# Patient Record
Sex: FEMALE | Race: ASIAN | Employment: STUDENT | URBAN - METROPOLITAN AREA
[De-identification: names, ages, dates, MRNs, and addresses within clinical notes are randomized per-mention and may not be internally consistent; named-entity substitution may affect disease eponyms.]

---
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
## 2024-06-21 ENCOUNTER — NURSE ONLY (OUTPATIENT)
Dept: FAMILY MEDICINE CLINIC | Facility: CLINIC | Age: 24
End: 2024-06-21

## 2024-06-21 DIAGNOSIS — Z23 NEED FOR VACCINATION: Primary | ICD-10-CM

## 2024-06-21 DIAGNOSIS — Z11.1 SCREENING FOR TUBERCULOSIS: ICD-10-CM

## 2024-06-21 PROCEDURE — 90715 TDAP VACCINE 7 YRS/> IM: CPT | Performed by: NURSE PRACTITIONER

## 2024-06-21 PROCEDURE — 90471 IMMUNIZATION ADMIN: CPT | Performed by: NURSE PRACTITIONER

## 2024-06-21 PROCEDURE — 86580 TB INTRADERMAL TEST: CPT | Performed by: NURSE PRACTITIONER

## 2024-06-21 NOTE — PROGRESS NOTES
Pt here for TB test and TDAP vaccine for school. Vaccine questionnaire completed for both TDAP and TB screening, reviewed by provider.   Placed TDAP to L deltoid, TB to LFA, pt tolerated well.   Provided pt w TDAP VIS and instructions on when to RTC for TB read, pt v/u.

## 2024-06-21 NOTE — PATIENT INSTRUCTIONS
You will need to return to clinic in 48-72 hours to have results of TB test read.     Please return to clinic on 6/23/24 after 9:45AM or on 6/24/24 before 9:45AM to have your TB test read.    If you do not return during this time your test will need to be repeated.     Our clinic hours are:  Monday-Friday        8:00 am to 7:30 pm  Saturday/Sunday    9:00 am to 4:30 pm    You can go to any of the [facility] Walk-In Clinics to have your TB test read.  To find your nearest Walk-In Clinic go to [email or web address]

## 2024-06-24 ENCOUNTER — OFFICE VISIT (OUTPATIENT)
Dept: FAMILY MEDICINE CLINIC | Facility: CLINIC | Age: 24
End: 2024-06-24

## 2024-06-24 DIAGNOSIS — Z11.1 ENCOUNTER FOR PPD SKIN TEST READING: Primary | ICD-10-CM

## 2024-06-24 LAB — INDURATION (): 0 MM (ref 0–11)

## 2024-06-24 NOTE — PROGRESS NOTES
Patient presents for PPD read.     Recent Results (from the past 24 hour(s))   TB /PPD intradermal test    Collection Time: 06/24/24  8:43 AM   Result Value Ref Range    Date Given: 06/21/24     Site: L forearm     INDURATION (PPD) 0.00 0.0 - 11 mm         Letter of results printed and given patient.    No further questions or concerns at this time.

## 2024-07-01 ENCOUNTER — OFFICE VISIT (OUTPATIENT)
Dept: FAMILY MEDICINE CLINIC | Facility: CLINIC | Age: 24
End: 2024-07-01
Payer: COMMERCIAL

## 2024-07-01 DIAGNOSIS — Z11.1 ENCOUNTER FOR PPD TEST: Primary | ICD-10-CM

## 2024-07-01 PROCEDURE — 86580 TB INTRADERMAL TEST: CPT | Performed by: NURSE PRACTITIONER

## 2024-07-01 NOTE — PATIENT INSTRUCTIONS
Please return to clinic on 7/3/24 after 9:30am  or on 7/4/24 before 9:30am  to have your TB test read.    Our clinic hours are:  Monday-Friday        8:00 am to 7:30 pm  Saturday/Sunday    9:00 am to 4:30 pm    You can go to any of the [facility] Walk-In Clinics to have your TB test read.  To find your nearest Walk-In Clinic go to [email or web address]

## 2024-07-01 NOTE — PROGRESS NOTES
Pt presents for second step of tb test. No contraindications for screening after reviewing pt questionnaire.  PPD test administered to right forearm. Pt tolerated well. Discussed and reviewed the return-window to have test read (return to clinic in 48-72 hours to have results of TB test read). Patient aware if she does not return during this time, the test will need to be repeated. Paper documentation of TB test with results will be provided to patient at the time of read. Pt verbalized understanding.

## 2024-07-03 ENCOUNTER — OFFICE VISIT (OUTPATIENT)
Dept: FAMILY MEDICINE CLINIC | Facility: CLINIC | Age: 24
End: 2024-07-03
Payer: COMMERCIAL

## 2024-07-03 DIAGNOSIS — Z11.1 ENCOUNTER FOR PPD SKIN TEST READING: Primary | ICD-10-CM

## 2024-07-03 LAB — INDURATION (): 0 MM (ref 0–11)

## 2024-07-03 NOTE — PROGRESS NOTES
Patient presents today for PPD skin test reading. Reading is negative. Letter printed and provided to patient.

## 2025-06-13 ENCOUNTER — NURSE ONLY (OUTPATIENT)
Dept: FAMILY MEDICINE CLINIC | Facility: CLINIC | Age: 25
End: 2025-06-13
Payer: COMMERCIAL

## 2025-06-13 DIAGNOSIS — Z11.1 SCREENING EXAMINATION FOR PULMONARY TUBERCULOSIS: Primary | ICD-10-CM

## 2025-06-13 PROCEDURE — 86580 TB INTRADERMAL TEST: CPT | Performed by: NURSE PRACTITIONER

## 2025-06-13 NOTE — PROGRESS NOTES
Gael Aly is a 25 year old female who presents for TB testing.    See TB flowsheet.   TB skin test placed today on LEFT forearm.

## 2025-06-15 ENCOUNTER — OFFICE VISIT (OUTPATIENT)
Dept: FAMILY MEDICINE CLINIC | Facility: CLINIC | Age: 25
End: 2025-06-15
Payer: COMMERCIAL

## 2025-06-15 DIAGNOSIS — Z11.1 ENCOUNTER FOR PPD SKIN TEST READING: Primary | ICD-10-CM

## 2025-06-15 LAB — INDURATION (): 0 MM (ref 0–11)

## 2025-06-15 NOTE — PROGRESS NOTES
Patient presents for PPD read.     Recent Results (from the past 24 hours)   TB test, PPD/Tubersol/Aplisol (62217) (Dx/Z11.1)    Collection Time: 06/15/25  9:14 AM   Result Value Ref Range    Date Given: 6/13/25     Site: Left Forearm     INDURATION (PPD) 0.00 0.0 - 11 mm         Letter of results printed and given patient.    No further questions or concerns at this time.

## 2025-06-27 ENCOUNTER — NURSE ONLY (OUTPATIENT)
Dept: FAMILY MEDICINE CLINIC | Facility: CLINIC | Age: 25
End: 2025-06-27
Payer: COMMERCIAL

## 2025-06-27 DIAGNOSIS — Z11.1 SCREENING FOR TUBERCULOSIS: Primary | ICD-10-CM

## 2025-06-27 PROCEDURE — 86580 TB INTRADERMAL TEST: CPT | Performed by: NURSE PRACTITIONER

## 2025-06-27 NOTE — PROGRESS NOTES
Pt reports to clinic for 2nd step of 2 step TB test.  Test placed by MA and pt tolerated well.  Pt advised must return within exactly 48-72 hours for TB read.  Pt verbalizes understanding.

## 2025-06-29 ENCOUNTER — NURSE ONLY (OUTPATIENT)
Dept: FAMILY MEDICINE CLINIC | Facility: CLINIC | Age: 25
End: 2025-06-29
Payer: COMMERCIAL

## 2025-06-29 DIAGNOSIS — Z11.1 ENCOUNTER FOR PPD SKIN TEST READING: Primary | ICD-10-CM

## 2025-06-29 LAB — INDURATION (): 0 MM (ref 0–11)

## (undated) NOTE — LETTER
June 29, 2025          Gael Bedolladeanne  [address]  [facility] [address]          Dear Gael Wilcox:    The following are the results of your recent tests. Please review the list of test results.  Your result is the value on the left; we have also supplied the range of normal (low and high) values.    Results for orders placed or performed in visit on 06/27/25   TB test, PPD/Tubersol/Aplisol (43245) (DX V74.1/Z11.1)    Collection Time: 06/29/25 11:25 AM   Result Value Ref Range    Date Given: 6/27/25     Site: Right Forearm     INDURATION (PPD) 0.00 0.0 - 11 mm       Please call if you have further questions,    [place] [facility]

## (undated) NOTE — LETTER
6/27/2025              Gael Bedollaanto        [address]        [place] [address]         Dear Gael Wilcox,    Please come back in 48 - 72 hours to have your TB test read. This will be Sunday 6/29/25 after 9:22 am or Monday 6/30/25 before 9:22 am.    Our hours are:  Monday - Friday : 8 am - 7:30 pm  Saturday - Sunday : 8 am - 3:30 pm    Sincerely,    FELY Lynch          Document electronically generated by:  Scarlett KING CMA

## (undated) NOTE — LETTER
Fannie 15, 2025          Gael Bedolladeanne  [address]  [facility] [address]          Dear Gael Wilcox:    The following are the results of your recent tests. Please review the list of test results.  Your result is the value on the left; we have also supplied the range of normal (low and high) values.    Results for orders placed or performed in visit on 06/13/25   TB test, PPD/Tubersol/Aplisol (41214) (Dx/Z11.1)    Collection Time: 06/15/25  9:14 AM   Result Value Ref Range    Date Given: 6/13/25     Site: Left Forearm     INDURATION (PPD) 0.00 0.0 - 11 mm       Please call if you have further questions,    [facility] [facility]

## (undated) NOTE — LETTER
6/13/2025              Gael Aly        [address] , [address]        [place] [address]         Dear Gael Wilcox,    Please come back in 48 - 72 hours to have your TB Skin Test read. This will be Sunday, 6/8/25 after 8:59 am or Monday, 6/9/25 before 8:59 am.    Our hours are:  Monday - Friday : 8 am - 7:30 pm  Saturday - Sunday : 8 am - 3:30 pm    Thank you            Document electronically generated by:  Scarlett KING CMA

## (undated) NOTE — MR AVS SNAPSHOT
[address], [address]  [address] [facility]  [phone]               Thank you for choosing us for your health care visit with Lucas Acuna MD.  We are glad to serve you and happy to provide you with this summa · Risky behaviors. Many teenagers are curious about drugs, alcohol, smoking, and sex. Talk openly about these issues. Answer your child’s questions, and don’t be afraid to ask questions of your own.  If you’re not sure how to approach these topics, talk to spent watching TV, playing video games, using the computer, and texting. If your teen has a TV, computer, or video game console in the bedroom, consider replacing it with a music player.   · Eat healthy.  Your child should eat fruits, vegetables, lean meats · Encourage your teen to keep a consistent bedtime, even on weekends. Sleeping is easier when the body follows a routine. Don’t let your teen stay up too late at night or sleep in too long in the morning. · Help your teen wake up, if needed.  Go into the b · Teach your child to make good decisions about drugs, alcohol, sex, and other risky behaviors.  Work together to come up with strategies for staying safe and dealing with peer pressure. Make sure your teenager knows he or she can always come to you for hel 04/01/17 : 50.349 kg (111 lb) (26 %*, Z = -0.63)  03/31/16 : 44.906 kg (99 lb) (10 %*, Z = -1.30)  03/21/15 : 40.824 kg (90 lb) (5 %*, Z = -1.66)    * Growth percentiles are based on CDC 2-20 Years data.    Ht Readings from Last 3 Encounters:  04/01/17 : 5' Regular Strength Caplet = 325 mg  Extra Strength Caplet = 500 mg                                                            Tylenol suspension   Childrens Chewable   Jr.  Strength Chewable    Regular strength   Extra  Strength 24-35 lbs                2.5 ml                            1 tsp                             1  36-47 lbs                                                      1&1/2 tsp           48-59 lbs                                                      2 tsp Starts to develop moral ideals and to select role models. If you have any concerns about your teen's development, check with your healthcare provider. Developed by [employer]. Published by [employer].   Last modified: 2010-07-28  Last reviewed: 2009 Healthy nutrition starts as early as infancy with breastfeeding. Once your baby begins eating solid foods, introduce nutritious foods early on and often. Sometimes toddlers need to try a food 10 times before they actually accept and enjoy it.  It is also im

## (undated) NOTE — Clinical Note
[facility], WALK-IN CLINIC, [facility]  [address] [facility] [address]  PH: [phone]  FAX: [phone]        25  Gael Aly, :  2000  [address] [place] [address]

## (undated) NOTE — Clinical Note
Name:  Chilango Swenson School Year:  11th Grade Class: Student ID No.:   Address:  [address] [place] [address] Phone:  [phone] (home) [phone] (work) :  16year old   Name Relationship [address] Work Phone Home Phone Mobile Phone implanted defibrillator? 12. Has anyone in your family had unexplained fainting, seizures, or near drowning?      BONE AND JOINT QUESTIONS Yes No   17. Have you ever had an injury to a bone, muscle, ligament, or tendon that caused you to miss a practice 39.Have you ever been unable to move your arms / legs after being hit /fall? 40. Have you ever become ill while exercising in the heat?     41. Do you get frequent muscle cramps when exercising? 42.  Do you or someone in your family have sickle cell · Location of point of maximal impulse (PMI) Yes    Pulses Yes    Lungs Yes    Abdomen Yes    Genitourinary (males only)* N/A    Skin:  HSV, lesions suggestive of MRSA, tinea corporis Yes    Neurologic* Yes    MUSCULOSKELETAL     Neck Yes    Back Yes    Sh hereby agree to submit to such testing and analysis by a certified laboratory.  We further understand and agree that the results of the performance-enhancing substance testing may be provided to certain individuals in my/our student’s high school as specifi

## (undated) NOTE — LETTER
June 24, 2024    Gael Wilcox She[place]  [address]  [facility] [address]      Dear Gael Wilcox:    The following are the results of your recent tests. Please review the list of test results.  Your result is the value on the left; we have also supplied the range of normal (low and high) values. Pt's TB test was negative today.     Results for orders placed or performed in visit on 06/21/24   TB /PPD intradermal test   Result Value Ref Range    Date Given: 06/21/24     Site: L forearm     INDURATION (PPD) 0.00 0.0 - 11 mm       Please call if you have further questions,          FELY Jiménez  Nurse Practitioner.

## (undated) NOTE — LETTER
July 3, 2024    Gael Bedolladeanne  [address]  [facility] [address]      Dear Gael Wilcox:    The following are the results of your recent tests. Please review the list of test results.  Your result is the value on the left; we have also supplied the range of normal (low and high) values.    Results for orders placed or performed in visit on 07/01/24   TB test, PPD/Tubersol/Aplisol ([id number]) (DX V74.1/Z11.1)   Result Value Ref Range    Date Given: 07/01/24     Site: R forearm     INDURATION (PPD) 0.00 0.0 - 11 mm       Please call if you have further questions,    KESHIA Ulrich

## (undated) NOTE — Clinical Note
[facility] [employer] of Child Health Examination       Student's Name  [address] [facility] Birth [place] Title                           Date    (If adding dates to the above immunization history section, put your initials by date(s) and sign here.)   ALTERNATIVE PROOF OF IMMUNITY   1 Diagnosis of asthma? Child wakes during the night coughing   Yes       No    Yes       No    Loss of function of one of paired organs? (eye/ear/kidney/testicle)   Yes       No      Birth Defects? Developmental delay?    Yes       No    Yes       No  Hospi Family History      Y              Ethnic Minority Y                            Signs of Insulin Resistance (hypertension, dyslipidemia, polycystic ovarian syndrome, acanthosis nigricans)                           At Risk     Lead Risk Questionnaire  Req'd NEEDS/MODIFICATIONS required in the school setting  None DIETARY Needs/Restrictions     None   SPECIAL INSTRUCTIONS/DEVICES e.g. safety glasses, glass eye, chest protector for arrhythmia, pacemaker, prosthetic device, dental bridge, false teeth, athleticsu